# Patient Record
Sex: FEMALE | Race: WHITE | HISPANIC OR LATINO | Employment: UNEMPLOYED | URBAN - METROPOLITAN AREA
[De-identification: names, ages, dates, MRNs, and addresses within clinical notes are randomized per-mention and may not be internally consistent; named-entity substitution may affect disease eponyms.]

---
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
## 2022-08-08 ENCOUNTER — OFFICE VISIT (OUTPATIENT)
Dept: NEUROLOGY | Facility: CLINIC | Age: 22
End: 2022-08-08

## 2022-08-08 VITALS
WEIGHT: 161 LBS | HEIGHT: 65 IN | BODY MASS INDEX: 26.82 KG/M2 | HEART RATE: 63 BPM | SYSTOLIC BLOOD PRESSURE: 111 MMHG | DIASTOLIC BLOOD PRESSURE: 72 MMHG

## 2022-08-08 DIAGNOSIS — G43.709 CHRONIC MIGRAINE WITHOUT AURA WITHOUT STATUS MIGRAINOSUS, NOT INTRACTABLE: ICD-10-CM

## 2022-08-08 DIAGNOSIS — G89.0 CENTRAL PAIN SYNDROME: Primary | ICD-10-CM

## 2022-08-08 PROCEDURE — 99205 OFFICE O/P NEW HI 60 MIN: CPT | Performed by: PSYCHIATRY & NEUROLOGY

## 2022-08-08 RX ORDER — MULTIPLE VITAMINS W/ MINERALS TAB 9MG-400MCG
1 TAB ORAL DAILY
COMMUNITY
End: 2022-12-19 | Stop reason: SDUPTHER

## 2022-08-08 RX ORDER — ESCITALOPRAM OXALATE 20 MG/1
20 TABLET ORAL DAILY
COMMUNITY
Start: 2022-07-31 | End: 2022-12-19 | Stop reason: SDUPTHER

## 2022-08-08 RX ORDER — MULTIVITAMIN WITH IRON
250 TABLET ORAL DAILY
COMMUNITY

## 2022-08-08 NOTE — ASSESSMENT & PLAN NOTE
I discussed with her that she has central pain syndrome.  I do not think she has neurologic disease that can cause her symptoms.  I explained to them in detail regarding different neurologic diseases that she does not fit in any of those categories.  I discussed with her that I would get an MRI of the brain and cervical spine and if it is unremarkable I would like for her to be evaluated by rheumatology.  I will follow her again in 2 to 3 months time and at that time I may do a nerve conduction study if no etiology is found.  I would recommend for also be her to pain management in the future if no etiology is found.

## 2022-08-08 NOTE — PROGRESS NOTES
"Chief Complaint  Neurologic Problem (Vitamin D deficiency, all over body nerve pain, no recent imaging. )    Subjective          Chaya Escamilla is a 21 y.o. female who presents to [facility]  History of Present Illness  21-year-old woman evaluated for a lot of symptoms.  She states that she has been stressed out working.  She states that her entire body is affected he has involuntary muscle spasms.  She states that her skin feels like on fire, itchy, burning but there is no rash.  States that there is pain everywhere and is predominantly in her left chest, left arm and her back.  When she was 10 years old it was in her thighs anteriorly and her shins anteriorly.  She states that she cannot control her hands get stuck.  She states that both gets numb especially when she is exercising doing things for prolonged period of time.  He states that symptoms can last for hours to days.  She has to wait for hours to days for her to be able to control her left arm and she has a hard time holding a pencil.  He feels like her muscles get weak.  She states that once in a while he she has problems swallowing and she is eating.  She states that this gums do not come together.  They can occur independently of each other.  She has had pain for years.  She has a history of migraine headache.  This migraine headaches are severe at least 16 days a month.  She can have a headache the last for 3 days at a time.  It is throbbing associated with light and noise sensitivity, nausea.    Objective   Vital Signs:   /72 (BP Location: Right arm, Patient Position: Sitting, Cuff Size: Adult)   Pulse 63   Ht 166 cm (65.35\")   Wt 73 kg (161 lb)   BMI 26.50 kg/m²     Physical Exam   She is alert, fluent, phasic, follows commands well.  Optic disc are normal bilaterally visual fields full confrontation, EOMs are full directions gaze, facial sensation symmetrical pinprick, facial strength is " full, soft palate elevation and tongue normal.  There is no weakness of the upper or lower extremities and with muscle testing.  Fine finger movements are intact.  Sensation is increased to pinprick in the left shoulder, arm, back compared to the right side.  There is no sensory level in her back.  Sensation symmetrical pinprick in the lower extremities.  Reflexes are normoactive and symmetrical in biceps, triceps, patellar's ankles.  Cerebellar testing is intact.  Station and gait she is able to tiptoe, heel walk, justina without difficulty.  Heart is regular and rhythm normal in rate.        Assessment and Plan  Diagnoses and all orders for this visit:    1. Central pain syndrome (Primary)  Assessment & Plan:  I discussed with her that she has central pain syndrome.  I do not think she has neurologic disease that can cause her symptoms.  I explained to them in detail regarding different neurologic diseases that she does not fit in any of those categories.  I discussed with her that I would get an MRI of the brain and cervical spine and if it is unremarkable I would like for her to be evaluated by rheumatology.  I will follow her again in 2 to 3 months time and at that time I may do a nerve conduction study if no etiology is found.  I would recommend for also be her to pain management in the future if no etiology is found.    Orders:  -     MRI Brain Without Contrast; Future  -     MRI Cervical Spine Without Contrast; Future    2. Chronic migraine without aura without status migrainosus, not intractable  Assessment & Plan:  I discussed with her that I we will treat her migraine headaches in the future.         Total time spent with the patient and coordinating patient care was 60 minutes.    Follow Up  No follow-ups on file.  Patient was given instructions and counseling regarding her condition or for health maintenance advice. Please see specific information pulled into the AVS if appropriate.

## 2022-08-11 ENCOUNTER — TELEPHONE (OUTPATIENT)
Dept: NEUROLOGY | Facility: OTHER | Age: 22
End: 2022-08-11

## 2022-09-02 ENCOUNTER — HOSPITAL ENCOUNTER (OUTPATIENT)
Dept: MRI IMAGING | Facility: HOSPITAL | Age: 22
Discharge: HOME | End: 2022-09-02

## 2022-09-02 DIAGNOSIS — G89.0 CENTRAL PAIN SYNDROME: ICD-10-CM

## 2022-09-02 PROCEDURE — 70551 MRI BRAIN STEM W/O DYE: CPT

## 2022-09-02 PROCEDURE — 72141 MRI NECK SPINE W/O DYE: CPT

## 2022-10-07 ENCOUNTER — TELEPHONE (OUTPATIENT)
Dept: NEUROLOGY | Facility: CLINIC | Age: 22
End: 2022-10-07

## 2022-10-07 NOTE — TELEPHONE ENCOUNTER
Caller: Chaya Escamilla    Relationship: Self    Best call back number: [phone]    What test was performed: MRI BRAIN & MRI CSPINE    When was the test performed: 9/2/2022    Where was the test performed: [facility]    Additional notes: PT CALLING FOR MRI RESULTS.    PLEASE REVIEW AND ADVISE.

## 2022-10-11 NOTE — TELEPHONE ENCOUNTER
No acute findings.  Mild disc bulge at C3 with no nerve impingement.  Can discuss with Oropilla in greater detail at f/u appt

## 2022-10-11 NOTE — TELEPHONE ENCOUNTER
Called and left voicemail for patient to return call to provide MRI results. Awaiting return call.

## 2022-10-11 NOTE — TELEPHONE ENCOUNTER
"Received return phone call from patient and provided her with MRI results (per Swetha's request), \"No acute findings.  Mild disc bulge at C3 with no nerve impingement.  Can discuss with Oropilla in greater detail at f/u appt\".  Patient verbalized understanding and did not have any further questions.  "

## 2024-10-25 ENCOUNTER — TELEPHONE (OUTPATIENT)
Dept: OBSTETRICS AND GYNECOLOGY | Facility: CLINIC | Age: 24
End: 2024-10-25
Payer: COMMERCIAL

## 2024-11-05 ENCOUNTER — TELEPHONE (OUTPATIENT)
Dept: OBSTETRICS AND GYNECOLOGY | Facility: CLINIC | Age: 24
End: 2024-11-05
Payer: COMMERCIAL

## 2024-11-08 NOTE — TELEPHONE ENCOUNTER
I have contacted this patient numerous times.  I am sending her a letter to have her call our office to reschedule .

## 2025-01-14 ENCOUNTER — OFFICE VISIT (OUTPATIENT)
Dept: OBSTETRICS AND GYNECOLOGY | Facility: CLINIC | Age: 25
End: 2025-01-14
Payer: OTHER GOVERNMENT

## 2025-01-14 VITALS
SYSTOLIC BLOOD PRESSURE: 117 MMHG | HEIGHT: 65 IN | BODY MASS INDEX: 26.82 KG/M2 | WEIGHT: 161 LBS | HEART RATE: 83 BPM | DIASTOLIC BLOOD PRESSURE: 76 MMHG

## 2025-01-14 DIAGNOSIS — R30.0 DYSURIA: ICD-10-CM

## 2025-01-14 DIAGNOSIS — N94.2 VAGINISMUS: ICD-10-CM

## 2025-01-14 DIAGNOSIS — Z01.411 ENCOUNTER FOR GYNECOLOGICAL EXAMINATION WITH ABNORMAL FINDING: Primary | ICD-10-CM

## 2025-01-14 LAB
HBV SURFACE AG SERPL QL IA: NORMAL
HCV AB SER QL: NORMAL
HIV 1+2 AB+HIV1 P24 AG SERPL QL IA: NORMAL

## 2025-01-14 PROCEDURE — 86803 HEPATITIS C AB TEST: CPT | Performed by: NURSE PRACTITIONER

## 2025-01-14 PROCEDURE — G0123 SCREEN CERV/VAG THIN LAYER: HCPCS | Performed by: NURSE PRACTITIONER

## 2025-01-14 PROCEDURE — 87591 N.GONORRHOEAE DNA AMP PROB: CPT | Performed by: NURSE PRACTITIONER

## 2025-01-14 PROCEDURE — 87491 CHLMYD TRACH DNA AMP PROBE: CPT | Performed by: NURSE PRACTITIONER

## 2025-01-14 PROCEDURE — 86592 SYPHILIS TEST NON-TREP QUAL: CPT | Performed by: NURSE PRACTITIONER

## 2025-01-14 PROCEDURE — G0432 EIA HIV-1/HIV-2 SCREEN: HCPCS | Performed by: NURSE PRACTITIONER

## 2025-01-14 PROCEDURE — 87661 TRICHOMONAS VAGINALIS AMPLIF: CPT | Performed by: NURSE PRACTITIONER

## 2025-01-14 PROCEDURE — 87340 HEPATITIS B SURFACE AG IA: CPT | Performed by: NURSE PRACTITIONER

## 2025-01-14 RX ORDER — CETIRIZINE HYDROCHLORIDE 5 MG/1
5 TABLET ORAL DAILY
COMMUNITY

## 2025-01-14 NOTE — PROGRESS NOTES
Venipuncture performed in Right Arm (site) by LAISHA (employee) with good hemostasis. Patient tolerated well. Date 01/14/2025.AF
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
## 2025-01-15 ENCOUNTER — TELEPHONE (OUTPATIENT)
Dept: OBSTETRICS AND GYNECOLOGY | Facility: CLINIC | Age: 25
End: 2025-01-15
Payer: OTHER GOVERNMENT

## 2025-01-15 LAB — RPR SER QL: NORMAL

## 2025-01-15 NOTE — TELEPHONE ENCOUNTER
Results given to patient asked few questions to assure it was patient before results given over phone confirmed . Last four of social and confirmation on verbal consent form in chart.

## 2025-01-15 NOTE — TELEPHONE ENCOUNTER
----- Message from Holden Esquivel sent at 1/15/2025  8:48 AM EST -----  Please let patient know her screen for HIV, Hep B, Hep C and syphilis are negative. Patient wants results given to her only.

## 2025-01-16 LAB
C TRACH RRNA SPEC QL NAA+PROBE: NEGATIVE
N GONORRHOEA RRNA SPEC QL NAA+PROBE: NEGATIVE
T VAGINALIS RRNA SPEC QL NAA+PROBE: NEGATIVE

## 2025-01-21 ENCOUNTER — TELEPHONE (OUTPATIENT)
Dept: OBSTETRICS AND GYNECOLOGY | Facility: CLINIC | Age: 25
End: 2025-01-21
Payer: OTHER GOVERNMENT

## 2025-01-21 LAB
CONV .: NORMAL
CYTOLOGIST CVX/VAG CYTO: NORMAL
CYTOLOGY CVX/VAG DOC CYTO: NORMAL
CYTOLOGY CVX/VAG DOC THIN PREP: NORMAL
DX ICD CODE: NORMAL
Lab: NORMAL
OTHER STN SPEC: NORMAL
STAT OF ADQ CVX/VAG CYTO-IMP: NORMAL

## 2025-01-21 NOTE — TELEPHONE ENCOUNTER
----- Message from Holden Esquivel sent at 1/21/2025  2:24 PM EST -----  Please let patient know her pap is negative, please give results to patient only.